# Patient Record
Sex: FEMALE | Race: WHITE | Employment: OTHER | URBAN - METROPOLITAN AREA
[De-identification: names, ages, dates, MRNs, and addresses within clinical notes are randomized per-mention and may not be internally consistent; named-entity substitution may affect disease eponyms.]

---

## 2019-08-13 PROCEDURE — 81001 URINALYSIS AUTO W/SCOPE: CPT | Performed by: INTERNAL MEDICINE

## 2019-08-13 PROCEDURE — 84300 ASSAY OF URINE SODIUM: CPT | Performed by: INTERNAL MEDICINE

## 2019-08-13 PROCEDURE — 83935 ASSAY OF URINE OSMOLALITY: CPT | Performed by: INTERNAL MEDICINE

## 2019-08-20 PROCEDURE — 83935 ASSAY OF URINE OSMOLALITY: CPT | Performed by: INTERNAL MEDICINE

## 2019-08-20 PROCEDURE — 84300 ASSAY OF URINE SODIUM: CPT | Performed by: INTERNAL MEDICINE

## 2019-08-20 PROCEDURE — 83930 ASSAY OF BLOOD OSMOLALITY: CPT | Performed by: INTERNAL MEDICINE

## 2019-10-10 PROBLEM — E22.2 SIADH (SYNDROME OF INAPPROPRIATE ADH PRODUCTION) (HCC): Status: ACTIVE | Noted: 2019-10-10

## 2019-10-10 PROBLEM — E66.01 MORBID OBESITY WITH BMI OF 40.0-44.9, ADULT (HCC): Status: ACTIVE | Noted: 2019-10-10

## 2019-10-10 PROBLEM — F31.9 BIPOLAR AFFECTIVE DISORDER, CURRENTLY ACTIVE (HCC): Status: ACTIVE | Noted: 2019-10-10

## 2019-10-10 PROBLEM — R56.9 SEIZURE (HCC): Status: ACTIVE | Noted: 2019-10-10

## 2019-10-10 PROBLEM — F90.9 ATTENTION DEFICIT HYPERACTIVITY DISORDER (ADHD): Status: ACTIVE | Noted: 2019-10-10

## 2020-02-27 PROBLEM — G89.4 CHRONIC PAIN SYNDROME: Status: ACTIVE | Noted: 2020-02-27

## 2020-02-27 PROBLEM — Z12.4 PAP SMEAR FOR CERVICAL CANCER SCREENING: Status: ACTIVE | Noted: 2020-02-27

## 2020-02-27 PROBLEM — Z00.00 ROUTINE MEDICAL EXAM: Status: ACTIVE | Noted: 2020-02-27

## 2020-02-27 PROBLEM — L73.2 HIDRADENITIS: Status: ACTIVE | Noted: 2020-02-27

## 2020-02-27 PROBLEM — F31.9 BIPOLAR DEPRESSION (HCC): Status: ACTIVE | Noted: 2019-10-10

## 2020-02-27 PROBLEM — F11.20 OPIOID DEPENDENCE WITH CURRENT USE (HCC): Status: ACTIVE | Noted: 2020-02-27

## 2020-02-27 PROBLEM — M54.50 CHRONIC BILATERAL LOW BACK PAIN: Status: ACTIVE | Noted: 2020-02-27

## 2020-02-27 PROBLEM — Z00.00 LABORATORY EXAM ORDERED AS PART OF ROUTINE GENERAL MEDICAL EXAMINATION: Status: ACTIVE | Noted: 2020-02-27

## 2020-02-27 PROBLEM — F41.9 ANXIETY: Status: ACTIVE | Noted: 2020-02-27

## 2020-02-27 PROBLEM — Z86.69 HISTORY OF MIGRAINE HEADACHES: Status: ACTIVE | Noted: 2020-02-27

## 2020-02-27 PROBLEM — G89.29 CHRONIC BILATERAL LOW BACK PAIN: Status: ACTIVE | Noted: 2020-02-27

## 2020-02-27 PROBLEM — F32.1 CURRENT MODERATE EPISODE OF MAJOR DEPRESSIVE DISORDER WITHOUT PRIOR EPISODE (HCC): Status: ACTIVE | Noted: 2020-02-27

## 2020-02-27 PROBLEM — N89.8 VAGINAL DISCHARGE: Status: ACTIVE | Noted: 2020-02-27

## 2020-02-27 PROBLEM — K64.9 HEMORRHOIDS, UNSPECIFIED HEMORRHOID TYPE: Status: ACTIVE | Noted: 2020-02-27

## 2020-08-16 PROBLEM — R94.31 QT PROLONGATION: Status: ACTIVE | Noted: 2020-08-16

## 2020-08-16 PROBLEM — R91.1 LUNG NODULE: Status: ACTIVE | Noted: 2020-08-16

## 2020-08-16 PROBLEM — M54.42 CHRONIC LEFT-SIDED LOW BACK PAIN WITH LEFT-SIDED SCIATICA: Status: ACTIVE | Noted: 2020-02-27

## 2020-08-16 PROBLEM — E87.1 HYPONATREMIA: Status: ACTIVE | Noted: 2020-08-16

## 2020-08-16 PROBLEM — G89.29 CHRONIC LEFT-SIDED LOW BACK PAIN WITH LEFT-SIDED SCIATICA: Status: ACTIVE | Noted: 2020-02-27

## 2020-08-31 PROBLEM — I77.9 BILATERAL CAROTID ARTERY DISEASE (HCC): Status: ACTIVE | Noted: 2020-08-31

## 2020-09-01 PROBLEM — F19.10 POLYSUBSTANCE ABUSE (HCC): Status: ACTIVE | Noted: 2020-09-01

## 2020-09-01 PROBLEM — R20.2 PARESTHESIA: Status: ACTIVE | Noted: 2020-09-01

## 2020-09-01 PROBLEM — I77.9 BILATERAL CAROTID ARTERY DISEASE, UNSPECIFIED TYPE (HCC): Status: ACTIVE | Noted: 2020-08-31

## 2020-09-01 PROBLEM — G89.29 CHRONIC MIDLINE LOW BACK PAIN, UNSPECIFIED WHETHER SCIATICA PRESENT: Status: ACTIVE | Noted: 2020-02-27

## 2020-09-01 PROBLEM — M54.50 CHRONIC MIDLINE LOW BACK PAIN, UNSPECIFIED WHETHER SCIATICA PRESENT: Status: ACTIVE | Noted: 2020-02-27

## 2020-09-01 PROBLEM — T50.901A ACCIDENTAL DRUG OVERDOSE, INITIAL ENCOUNTER: Status: ACTIVE | Noted: 2020-09-01

## 2020-10-01 ENCOUNTER — PATIENT OUTREACH (OUTPATIENT)
Dept: CASE MANAGEMENT | Age: 40
End: 2020-10-01

## 2020-10-01 NOTE — PROGRESS NOTES
Called patient home phone [phone] and asked them to call back for ariel call. This was done at 4:10 on 10/1/20.

## 2020-10-02 NOTE — PROGRESS NOTES
Name: Milana Berry       : 1980   Gender: female   Race: White   Ethnicity: NON  OR  OR  ETHNICITY   Employer Group:   [id number] [id number] [[id number]]     Insurance Information:        Medical Group Name: [facility]

## 2020-10-27 PROBLEM — I77.9 BILATERAL CAROTID ARTERY DISEASE, UNSPECIFIED TYPE (HCC): Status: RESOLVED | Noted: 2020-08-31 | Resolved: 2020-10-27

## 2020-10-27 PROBLEM — I65.23 MILD ATHEROSCLEROSIS OF CAROTID ARTERY, BILATERAL: Status: ACTIVE | Noted: 2020-10-27

## 2021-01-05 PROBLEM — F11.10: Status: ACTIVE | Noted: 2021-01-05

## 2021-05-26 PROBLEM — G93.49 LEUKOENCEPHALOPATHY: Status: ACTIVE | Noted: 2021-05-26

## 2021-05-26 PROBLEM — R41.89 COGNITIVE CHANGE: Status: ACTIVE | Noted: 2021-05-26

## 2021-08-01 PROBLEM — Z00.00 LABORATORY EXAM ORDERED AS PART OF ROUTINE GENERAL MEDICAL EXAMINATION: Status: RESOLVED | Noted: 2020-02-27 | Resolved: 2021-08-01

## 2021-08-01 PROBLEM — L73.2 HIDRADENITIS: Status: RESOLVED | Noted: 2020-02-27 | Resolved: 2021-08-01

## 2021-08-01 PROBLEM — L73.2 HIDRADENITIS AXILLARIS: Status: ACTIVE | Noted: 2021-08-01

## 2021-08-01 PROBLEM — R20.2 PARESTHESIA: Status: RESOLVED | Noted: 2020-09-01 | Resolved: 2021-08-01

## 2021-08-01 PROBLEM — G93.49 LEUKOENCEPHALOPATHY: Status: RESOLVED | Noted: 2021-05-26 | Resolved: 2021-08-01

## 2021-08-01 PROBLEM — Z12.4 PAP SMEAR FOR CERVICAL CANCER SCREENING: Status: RESOLVED | Noted: 2020-02-27 | Resolved: 2021-08-01

## 2021-08-01 PROBLEM — N89.8 VAGINAL DISCHARGE: Status: RESOLVED | Noted: 2020-02-27 | Resolved: 2021-08-01

## 2021-08-01 PROBLEM — B37.9 ANTIBIOTIC-INDUCED YEAST INFECTION: Status: ACTIVE | Noted: 2021-08-01

## 2021-08-01 PROBLEM — T36.95XA ANTIBIOTIC-INDUCED YEAST INFECTION: Status: ACTIVE | Noted: 2021-08-01

## 2021-08-01 PROBLEM — K64.9 HEMORRHOIDS, UNSPECIFIED HEMORRHOID TYPE: Status: RESOLVED | Noted: 2020-02-27 | Resolved: 2021-08-01

## 2021-08-01 PROBLEM — K21.9 GASTROESOPHAGEAL REFLUX DISEASE, UNSPECIFIED WHETHER ESOPHAGITIS PRESENT: Status: ACTIVE | Noted: 2021-08-01

## 2021-08-01 PROBLEM — E87.1 HYPONATREMIA: Status: RESOLVED | Noted: 2020-08-16 | Resolved: 2021-08-01

## 2021-08-01 PROBLEM — R73.03 PREDIABETES: Status: ACTIVE | Noted: 2021-08-01

## 2021-08-01 PROBLEM — R41.89 COGNITIVE CHANGE: Status: RESOLVED | Noted: 2021-05-26 | Resolved: 2021-08-01

## 2021-08-01 PROBLEM — G89.4 CHRONIC PAIN SYNDROME: Status: RESOLVED | Noted: 2020-02-27 | Resolved: 2021-08-01
